# Patient Record
Sex: FEMALE | Race: BLACK OR AFRICAN AMERICAN | NOT HISPANIC OR LATINO | URBAN - METROPOLITAN AREA
[De-identification: names, ages, dates, MRNs, and addresses within clinical notes are randomized per-mention and may not be internally consistent; named-entity substitution may affect disease eponyms.]

---

## 2024-05-19 ENCOUNTER — EMERGENCY (EMERGENCY)
Facility: HOSPITAL | Age: 64
LOS: 1 days | Discharge: HOME | End: 2024-05-19
Admitting: EMERGENCY MEDICINE
Payer: MEDICARE

## 2024-05-19 VITALS
HEART RATE: 67 BPM | DIASTOLIC BLOOD PRESSURE: 79 MMHG | TEMPERATURE: 98 F | SYSTOLIC BLOOD PRESSURE: 167 MMHG | OXYGEN SATURATION: 100 % | RESPIRATION RATE: 18 BRPM

## 2024-05-19 VITALS
WEIGHT: 263.89 LBS | SYSTOLIC BLOOD PRESSURE: 157 MMHG | HEART RATE: 71 BPM | DIASTOLIC BLOOD PRESSURE: 98 MMHG | RESPIRATION RATE: 16 BRPM | TEMPERATURE: 98 F | OXYGEN SATURATION: 95 %

## 2024-05-19 DIAGNOSIS — M25.511 PAIN IN RIGHT SHOULDER: ICD-10-CM

## 2024-05-19 DIAGNOSIS — Z88.0 ALLERGY STATUS TO PENICILLIN: ICD-10-CM

## 2024-05-19 DIAGNOSIS — S00.93XA CONTUSION OF UNSPECIFIED PART OF HEAD, INITIAL ENCOUNTER: ICD-10-CM

## 2024-05-19 DIAGNOSIS — R07.81 PLEURODYNIA: ICD-10-CM
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
## 2024-05-19 DIAGNOSIS — M54.50 LOW BACK PAIN, UNSPECIFIED: ICD-10-CM

## 2024-05-19 PROCEDURE — 72125 CT NECK SPINE W/O DYE: CPT | Mod: 26,MC

## 2024-05-19 PROCEDURE — 99284 EMERGENCY DEPT VISIT MOD MDM: CPT

## 2024-05-19 PROCEDURE — 73030 X-RAY EXAM OF SHOULDER: CPT | Mod: 26,RT

## 2024-05-19 PROCEDURE — 73060 X-RAY EXAM OF HUMERUS: CPT | Mod: 26,RT

## 2024-05-19 PROCEDURE — 70450 CT HEAD/BRAIN W/O DYE: CPT | Mod: 26,MC

## 2024-05-19 PROCEDURE — 72131 CT LUMBAR SPINE W/O DYE: CPT | Mod: 26,MC

## 2024-05-19 PROCEDURE — 71250 CT THORAX DX C-: CPT | Mod: 26,MC

## 2024-05-19 RX ORDER — LIDOCAINE 4 G/100G
1 CREAM TOPICAL ONCE
Refills: 0 | Status: COMPLETED | OUTPATIENT
Start: 2024-05-19 | End: 2024-05-19

## 2024-05-19 RX ORDER — KETOROLAC TROMETHAMINE 30 MG/ML
15 SYRINGE (ML) INJECTION ONCE
Refills: 0 | Status: DISCONTINUED | OUTPATIENT
Start: 2024-05-19 | End: 2024-05-19

## 2024-05-19 RX ADMIN — LIDOCAINE 1 PATCH: 4 CREAM TOPICAL at 15:40

## 2024-05-19 RX ADMIN — Medication 15 MILLIGRAM(S): at 15:40

## 2024-05-19 NOTE — ED PROVIDER NOTE - PATIENT PORTAL LINK FT
You can access the FollowMyHealth Patient Portal offered by [facility] by registering at the following website: [email or web address]. By joining [facility]’s FollowMyHealth portal, you will also be able to view your health information using other applications (apps) compatible with our system.

## 2024-05-19 NOTE — ED PROVIDER NOTE - NSFOLLOWUPINSTRUCTIONS_ED_ALL_ED_FT
A facial or scalp contusion is a bruise (contusion) on the face or head. Contusions are the result of a direct force (blunttrauma) to the face or scalp that has caused bleeding under the skin. The contusion may turn blue, purple, or yellow (discoloration). Minor injuries may cause a painless contusion, but more severe contusions may stay painful and swollen for a few weeks.    Injuries to the face and head generally cause a lot of swelling and discoloration, especially around the eyes. Contusions by themselves are generally not life threatening. You may have a contusion along with other injuries, such as broken bones (fractures), cuts, and injuries to blood vessels.    What are the causes?  A facial or scalp contusion is caused by a injury, fall, or trauma to the face or head area. Contusions may result from:  Motor vehicle accidents.  Sports injuries.  Assault injuries.  What are the signs or symptoms?  Symptoms of this condition include:  Swelling of the injured area. The swelling may be in a small area (localized) and very noticeable.  Discoloration of the injured area.  Tenderness, soreness, or pain in the injured area.  In addition to symptoms of contusions, if you have facial fractures, you may have a change in the appearance of your nose, may not be able to close your mouth, and may have vision changes.    How is this diagnosed?  This condition is diagnosed based on your medical history and a physical exam. An X-ray exam, CT scan, or MRI may be needed to check for any additional injuries. In some cases, your health care provider may need to do more examinations of your nose, eyes, or jaw.    How is this treated?  Often, the best treatment for a facial or scalp contusion is applying cold compresses to the injured area. Over-the-counter medicines may also be recommended to help relieve pain.    If there are any cuts (lacerations), these will need to be repaired as well. Any deeper injuries may require treatment and follow up with a specialist, such as a facial surgeon or an eye specialist (ophthalmologist).    Follow these instructions at home:  Managing pain, stiffness, and swelling    Bag of ice on a towel on the skin.   If directed, put ice on the injured area. To do this:  Put ice in a plastic bag.  Place a towel between your skin and the bag.  Leave the ice on for 20 minutes, 2–3 times a day.  Remove the ice if your skin turns bright red. This is very important. If you cannot feel pain, heat, or cold, you have a greater risk of damage to the area.  Raise (elevate) the injured area above the level of your heart while you are sitting or lying down.  General instructions    Take over-the-counter and prescription medicines only as told by your health care provider.  Rest as told by your health care provider.  Return to your normal activities as told by your health care provider. Ask your health care provider what activities are safe for you.  Do not blow your nose if you have any facial fractures.  Eat soft foods if you are having jaw pain.  Keep all follow-up visits. This is important.  Contact a health care provider if:  You have trouble biting or chewing.  Your pain or swelling gets worse.  The discolored area gets much worse.  Get help right away if:  You have severe pain or a headache that is not relieved by medicine.  You have unusual sleepiness, confusion, or personality changes.  You vomit.  You have a nosebleed that does not stop.  You have double vision or blurred vision.  You have clear fluid draining from your nose or ear, and it does not go away.  You have trouble walking or using your arms or legs.  You have severe dizziness.  Summary  A facial or scalp contusion is a bruise (contusion) on the face or head.  Contusions are the result of an injury that caused bleeding and swelling under the skin.  Minor contusions will have mild symptoms, but more severe contusions may stay painful and swollen for a few weeks.  Some facial and head contusions may be associated with other more serious injuries. Go to a health care provider if you have vision changes, bleeding from your face or nose, or you are not able to to bite down normally.  Often, the best treatment for a facial or scalp contusion is applying cold compresses to the injured area.  This information is not intended to replace advice given to you by your health care provider. Make sure you discuss any questions you have with your health care provider.

## 2024-05-19 NOTE — ED ADULT TRIAGE NOTE - CHIEF COMPLAINT QUOTE
Pt s/p slip and fall out of bath tub, + headstrike and c.o right shoulder pain. Denies LOC or anticoagulate use

## 2024-05-19 NOTE — ED PROVIDER NOTE - OBJECTIVE STATEMENT
62 y/o F PMH Scleroderma presents s/p mechanical fall. pt reports that she was getting into the shower, was attempting to turn the shower on, slipped on the water resulting in her falling to the ground and hitting her head. she also reports that she has right shoulder and rt rib pain and lower back pain after the fall. she has been ambulatory after. denies ac/antiplt use. denies dizziness, changes in vision, abd pain, sob, rivas, n/v/d. denies LOC. no other medical complaints.

## 2024-05-19 NOTE — ED PROVIDER NOTE - CLINICAL SUMMARY MEDICAL DECISION MAKING FREE TEXT BOX
64 y/o F PMH Scleroderma presents s/p mechanical fall. pt reports that she was getting into the shower, was attempting to turn the shower on, slipped on the water resulting in her falling to the ground and hitting her head. she also reports that she has right shoulder and rt rib pain and lower back pain after the fall. she has been ambulatory after.  PE as above   will preform CTH,CS, LS, CT chest r/o rib fx   plan for shoulder xr r/o fx given the pain with ROM  [facility] will [facility]   reassess for dispo 64 y/o F PMH Scleroderma presents s/p mechanical fall. pt reports that she was getting into the shower, was attempting to turn the shower on, slipped on the water resulting in her falling to the ground and hitting her head. she also reports that she has right shoulder and rt rib pain and lower back pain after the fall. she has been ambulatory after.  PE as above   will preform CTH,CS, LS, CT chest r/o rib fx   plan for shoulder xr r/o fx given the pain with ROM  pt [facility], has pcp, will follow up   reassess for dispo  imaging neg reviewed w pt. Reviewed concussion sxs with pt, rolf w rter prec dw pt

## 2024-05-19 NOTE — ED ADULT TRIAGE NOTE - BP NONINVASIVE SYSTOLIC (MM HG)
"Complaining that he has been experiencing a \"very bad smell\" in his ileostomy bag since starting the Icosapent Ethyl . Stated he had a similar experience back when he tried to take fish oil.     Called [place] pharmacist. No specific side effect noted for this. Perhaps an absorbance issue.     Patient is going to stop taking the Icosapent Ethyl for now to see if the oder goes away. He will update us next week.     Javier Cruz, [facility] who prescribed the medication, is out of the office this week. Will update him with situation and results next week.   " [phone]

## 2024-05-19 NOTE — ED PROVIDER NOTE - PHYSICAL EXAMINATION
· CONSTITUTIONAL: Well appearing, well nourished, awake, alert, oriented to person, place, time/situation and in no apparent distress.  · HEAD: Head atraumatic, normal cephalic shape.  · HEAD: Head is atraumatic. Head shape is symmetrical.  · CARDIAC: Normal rate, regular rhythm.  Heart sounds S1, S2.  No murmurs, rubs or gallops.  · RESPIRATORY: Breath sounds clear and equal bilaterally.  · GASTROINTESTINAL: Abdomen soft, non-tender, no guarding.  · MUSCULOSKELETAL: +TTP Rt lateral rib proximally, no CT midline TTP or stepoffs, +Lumbar paraspinal ttp, neg log roll, no hip tpp. FROM b/u LE, DP/PT 2+, SILT, muscle strength 5/5. +Pain with all ROM of the rt shoulder, SILT, FROM elbow, wrist, hand, digits b/l, cap refill < 3 sec, radial pulses 2+  · NEUROLOGICAL: Alert and oriented, no focal deficits, no motor or sensory deficits.  · SKIN: Skin normal color for race, warm, dry and intact. No evidence of rash.  · PSYCHIATRIC: Alert and oriented to person, place, time/situation. normal mood and affect. no apparent risk to self or others.